# Patient Record
Sex: MALE
[De-identification: names, ages, dates, MRNs, and addresses within clinical notes are randomized per-mention and may not be internally consistent; named-entity substitution may affect disease eponyms.]

---

## 2023-12-08 ENCOUNTER — NON-APPOINTMENT (OUTPATIENT)
Age: 58
End: 2023-12-08

## 2023-12-08 ENCOUNTER — RX RENEWAL (OUTPATIENT)
Age: 58
End: 2023-12-08

## 2023-12-08 DIAGNOSIS — Z86.79 PERSONAL HISTORY OF OTHER DISEASES OF THE CIRCULATORY SYSTEM: ICD-10-CM

## 2023-12-08 DIAGNOSIS — Z78.9 OTHER SPECIFIED HEALTH STATUS: ICD-10-CM

## 2023-12-08 DIAGNOSIS — Z82.49 FAMILY HISTORY OF ISCHEMIC HEART DISEASE AND OTHER DISEASES OF THE CIRCULATORY SYSTEM: ICD-10-CM

## 2023-12-09 ENCOUNTER — RX RENEWAL (OUTPATIENT)
Age: 58
End: 2023-12-09

## 2023-12-11 ENCOUNTER — RX RENEWAL (OUTPATIENT)
Age: 58
End: 2023-12-11

## 2023-12-14 ENCOUNTER — APPOINTMENT (OUTPATIENT)
Dept: INTERNAL MEDICINE | Facility: CLINIC | Age: 58
End: 2023-12-14
Payer: COMMERCIAL

## 2023-12-14 VITALS
HEIGHT: 65 IN | BODY MASS INDEX: 27.32 KG/M2 | DIASTOLIC BLOOD PRESSURE: 93 MMHG | WEIGHT: 164 LBS | SYSTOLIC BLOOD PRESSURE: 147 MMHG

## 2023-12-14 VITALS — DIASTOLIC BLOOD PRESSURE: 86 MMHG | SYSTOLIC BLOOD PRESSURE: 140 MMHG

## 2023-12-14 PROCEDURE — 99213 OFFICE O/P EST LOW 20 MIN: CPT

## 2023-12-14 NOTE — HISTORY OF PRESENT ILLNESS
[FreeTextEntry1] : Follow-up visit [de-identified] : 58 yrs old male here for followup visit. On prior exam, patient had to discontinue Zetia due to elevated CK level of 770.  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   Last LDL was 92 on medication and Hba1c was 5.7 in 7/2023 Patient states does regular exercise and is following a diabetic diet, low salt and low fat. BP today was elevated  Allergies: NKDA- but intolerance to Lipitor/Zetia with myositis Medication : Off Zetia, no other changes Last eye exam 2023 - due back 1/2024 Last colonoscopy due 2024 (prior 2019- no polyps)  - last PSA 0.8 (5/2023) Last Urology 2-3 yrs ago- due now Cardiology: Last check 5 yrs ago

## 2023-12-14 NOTE — PHYSICAL EXAM
[No Acute Distress] : no acute distress [Well Nourished] : well nourished [Well Developed] : well developed [Well-Appearing] : well-appearing [Normal Sclera/Conjunctiva] : normal sclera/conjunctiva [PERRL] : pupils equal round and reactive to light [EOMI] : extraocular movements intact [Normal Outer Ear/Nose] : the outer ears and nose were normal in appearance [Normal Oropharynx] : the oropharynx was normal [No JVD] : no jugular venous distention [No Lymphadenopathy] : no lymphadenopathy [Supple] : supple [Thyroid Normal, No Nodules] : the thyroid was normal and there were no nodules present [No Respiratory Distress] : no respiratory distress  [No Accessory Muscle Use] : no accessory muscle use [Clear to Auscultation] : lungs were clear to auscultation bilaterally [Normal Rate] : normal rate  [Regular Rhythm] : with a regular rhythm [Normal S1, S2] : normal S1 and S2 [No Murmur] : no murmur heard [Pedal Pulses Present] : the pedal pulses are present [No Edema] : there was no peripheral edema [Soft] : abdomen soft [Non Tender] : non-tender [Non-distended] : non-distended [Normal Bowel Sounds] : normal bowel sounds [Normal Posterior Cervical Nodes] : no posterior cervical lymphadenopathy [Normal Anterior Cervical Nodes] : no anterior cervical lymphadenopathy [No Joint Swelling] : no joint swelling [Grossly Normal Strength/Tone] : grossly normal strength/tone [No Rash] : no rash [Coordination Grossly Intact] : coordination grossly intact [No Focal Deficits] : no focal deficits [Normal Gait] : normal gait [Normal Affect] : the affect was normal [Normal Insight/Judgement] : insight and judgment were intact

## 2023-12-14 NOTE — PLAN
[FreeTextEntry1] : 58 yrs old here for follow up visit.  He was noted to have elevated BP today.   Hx DMII- well controlled on diet and medication Hx hyperlipidemia - medication caused myositis and both Atorvastatin and Zetia were discontinued. Full labwork ordered now to check HbA1c, lipid and routine labs, PSA Urology consult for BPH and current penile complaints. Cardiology appt- due Preventive testing discussed with pt- due 2024 for colonoscopy Ophthalmology exam due next month All preventive screening discussed with pt Med reconciliation done and meds renewed Diet/exercise counseling done RV 6 mos- annual wellness exam Pt understands instructions and will monitor BPs at home- goal under 130/80

## 2023-12-14 NOTE — REVIEW OF SYSTEMS
[Negative] : Heme/Lymph [Dysuria] : no dysuria [Incontinence] : no incontinence [Hesitancy] : no hesitancy [Nocturia] : no nocturia [Hematuria] : no hematuria [Frequency] : no frequency [Impotence] : no impotency [Poor Libido] : libido not poor [FreeTextEntry8] : Requesting new urology referral due to penile problem

## 2023-12-16 ENCOUNTER — NON-APPOINTMENT (OUTPATIENT)
Age: 58
End: 2023-12-16

## 2023-12-16 ENCOUNTER — TRANSCRIPTION ENCOUNTER (OUTPATIENT)
Age: 58
End: 2023-12-16

## 2023-12-16 LAB
ALBUMIN SERPL ELPH-MCNC: 4.8 G/DL
ALP BLD-CCNC: 48 U/L
ALT SERPL-CCNC: 21 U/L
ANION GAP SERPL CALC-SCNC: 10 MMOL/L
AST SERPL-CCNC: 23 U/L
BASOPHILS # BLD AUTO: 0 K/UL
BASOPHILS NFR BLD AUTO: 0 %
BILIRUB SERPL-MCNC: 0.6 MG/DL
BUN SERPL-MCNC: 20 MG/DL
CALCIUM SERPL-MCNC: 9.8 MG/DL
CHLORIDE SERPL-SCNC: 103 MMOL/L
CHOLEST SERPL-MCNC: 199 MG/DL
CK SERPL-CCNC: 404 U/L
CO2 SERPL-SCNC: 27 MMOL/L
CREAT SERPL-MCNC: 0.77 MG/DL
CRP SERPL-MCNC: <3 MG/L
EGFR: 105 ML/MIN/1.73M2
EOSINOPHIL # BLD AUTO: 0.02 K/UL
EOSINOPHIL NFR BLD AUTO: 0.4 %
ERYTHROCYTE [SEDIMENTATION RATE] IN BLOOD BY WESTERGREN METHOD: 3 MM/HR
ESTIMATED AVERAGE GLUCOSE: 123 MG/DL
GLUCOSE SERPL-MCNC: 123 MG/DL
HBA1C MFR BLD HPLC: 5.9 %
HCT VFR BLD CALC: 42.5 %
HDLC SERPL-MCNC: 71 MG/DL
HGB BLD-MCNC: 13.8 G/DL
IMM GRANULOCYTES NFR BLD AUTO: 0.2 %
IRON SATN MFR SERPL: 28 %
IRON SERPL-MCNC: 88 UG/DL
LDLC SERPL CALC-MCNC: 120 MG/DL
LYMPHOCYTES # BLD AUTO: 2 K/UL
LYMPHOCYTES NFR BLD AUTO: 41.2 %
MAGNESIUM SERPL-MCNC: 1.9 MG/DL
MAN DIFF?: NORMAL
MCHC RBC-ENTMCNC: 29.9 PG
MCHC RBC-ENTMCNC: 32.5 GM/DL
MCV RBC AUTO: 92.2 FL
MONOCYTES # BLD AUTO: 0.46 K/UL
MONOCYTES NFR BLD AUTO: 9.5 %
NEUTROPHILS # BLD AUTO: 2.36 K/UL
NEUTROPHILS NFR BLD AUTO: 48.7 %
NONHDLC SERPL-MCNC: 128 MG/DL
PLATELET # BLD AUTO: 151 K/UL
POTASSIUM SERPL-SCNC: 4.7 MMOL/L
PROT SERPL-MCNC: 6.9 G/DL
PSA SERPL-MCNC: 0.49 NG/ML
RBC # BLD: 4.61 M/UL
RBC # FLD: 11.8 %
SODIUM SERPL-SCNC: 140 MMOL/L
T4 FREE SERPL-MCNC: 1.2 NG/DL
TIBC SERPL-MCNC: 318 UG/DL
TRIGL SERPL-MCNC: 41 MG/DL
TSH SERPL-ACNC: 1.12 UIU/ML
UIBC SERPL-MCNC: 229 UG/DL
WBC # FLD AUTO: 4.85 K/UL

## 2024-01-29 ENCOUNTER — NON-APPOINTMENT (OUTPATIENT)
Age: 59
End: 2024-01-29

## 2024-01-29 ENCOUNTER — APPOINTMENT (OUTPATIENT)
Dept: CARDIOLOGY | Facility: CLINIC | Age: 59
End: 2024-01-29
Payer: COMMERCIAL

## 2024-01-29 VITALS
SYSTOLIC BLOOD PRESSURE: 130 MMHG | BODY MASS INDEX: 27.32 KG/M2 | WEIGHT: 164 LBS | OXYGEN SATURATION: 98 % | HEIGHT: 65 IN | DIASTOLIC BLOOD PRESSURE: 80 MMHG | HEART RATE: 75 BPM

## 2024-01-29 DIAGNOSIS — R01.1 CARDIAC MURMUR, UNSPECIFIED: ICD-10-CM

## 2024-01-29 PROCEDURE — 99203 OFFICE O/P NEW LOW 30 MIN: CPT

## 2024-01-29 PROCEDURE — 93000 ELECTROCARDIOGRAM COMPLETE: CPT

## 2024-01-29 RX ORDER — ATORVASTATIN CALCIUM 10 MG/1
10 TABLET, FILM COATED ORAL DAILY
Refills: 0 | Status: DISCONTINUED | COMMUNITY
End: 2024-01-29

## 2024-01-29 NOTE — PHYSICAL EXAM

## 2024-01-29 NOTE — ASSESSMENT
[FreeTextEntry1] : An echocardiogram was ordered for further evaluation of his murmur He would potentially benefit from non statin/Zeita cholesterol modification; prior to starting therapy a stress test will be obtained; if negative a calcium score will be considered

## 2024-01-29 NOTE — REASON FOR VISIT
[FreeTextEntry1] : The patient has a history of DM and HTN He has failed statins and Zetia due to myositis His last LDL was 120 Currently he feels well On exam he has a 3/6 MR and TR murmur. His PMI is somewhat diffuse but is likely normal. His last echocardiogram was 2022 (mild MR/TR) 10year ASCVD risk=11.9%

## 2024-02-12 ENCOUNTER — APPOINTMENT (OUTPATIENT)
Dept: CARDIOLOGY | Facility: CLINIC | Age: 59
End: 2024-02-12
Payer: COMMERCIAL

## 2024-02-12 PROCEDURE — 93306 TTE W/DOPPLER COMPLETE: CPT

## 2024-03-11 ENCOUNTER — APPOINTMENT (OUTPATIENT)
Dept: CARDIOLOGY | Facility: CLINIC | Age: 59
End: 2024-03-11
Payer: COMMERCIAL

## 2024-03-11 PROCEDURE — 93351 STRESS TTE COMPLETE: CPT

## 2024-04-15 ENCOUNTER — APPOINTMENT (OUTPATIENT)
Dept: UROLOGY | Facility: CLINIC | Age: 59
End: 2024-04-15
Payer: COMMERCIAL

## 2024-04-15 VITALS
WEIGHT: 165 LBS | BODY MASS INDEX: 27.49 KG/M2 | DIASTOLIC BLOOD PRESSURE: 84 MMHG | HEART RATE: 75 BPM | HEIGHT: 65 IN | SYSTOLIC BLOOD PRESSURE: 137 MMHG

## 2024-04-15 PROCEDURE — 99204 OFFICE O/P NEW MOD 45 MIN: CPT

## 2024-04-15 RX ORDER — EZETIMIBE 10 MG/1
10 TABLET ORAL DAILY
Refills: 0 | Status: DISCONTINUED | COMMUNITY
End: 2024-04-15

## 2024-04-15 NOTE — PHYSICAL EXAM
[Normal Appearance] : normal appearance [Well Groomed] : well groomed [General Appearance - In No Acute Distress] : no acute distress [Edema] : no peripheral edema [Respiration, Rhythm And Depth] : normal respiratory rhythm and effort [Exaggerated Use Of Accessory Muscles For Inspiration] : no accessory muscle use [Abdomen Soft] : soft [Abdomen Tenderness] : non-tender [Costovertebral Angle Tenderness] : no ~M costovertebral angle tenderness [Urethral Meatus] : meatus normal [Penis Abnormality] : normal uncircumcised penis [Urinary Bladder Findings] : the bladder was normal on palpation [Scrotum] : the scrotum was normal [Epididymis] : the epididymides were normal [Testes Tenderness] : no tenderness of the testes [Testes Mass (___cm)] : there were no testicular masses [Normal Station and Gait] : the gait and station were normal for the patient's age [] : no rash [No Focal Deficits] : no focal deficits [Oriented To Time, Place, And Person] : oriented to person, place, and time [Affect] : the affect was normal [Mood] : the mood was normal [No Palpable Adenopathy] : no palpable adenopathy

## 2024-04-15 NOTE — HISTORY OF PRESENT ILLNESS
[FreeTextEntry1] : 57yo gentleman with cc of low T. Pt has been seen by Dr Gregorio. Was seen for BPH. He was initially referred to him for annual exam. Pt reports that his PSA is good at <1. At baseline, no urinary complaints. Checked blood work and was told T was low. Believes blood work was done in the afternoon. He reports that he is having intermittent issues with achieving an erection. he states he had a weeks to months where he was having more difficulty. Not as much now. However, he does note that his desire is lower over the last year. Sexually active with his wife. No use of PDE5Is. He reports decreased ability to acheive erections with lower strength. No loss of erection prior to ejaculation. No use of meds.   has hx of DM on metofrmin. Last a1c was 5.9 in Dec 2023. Has HTN on telmisartan. No smoking. Has AMAN but does not always wear mask.   PSA is 0.49 in 12/2023.

## 2024-06-15 ENCOUNTER — NON-APPOINTMENT (OUTPATIENT)
Age: 59
End: 2024-06-15

## 2024-06-15 ENCOUNTER — APPOINTMENT (OUTPATIENT)
Dept: INTERNAL MEDICINE | Facility: CLINIC | Age: 59
End: 2024-06-15
Payer: COMMERCIAL

## 2024-06-15 VITALS
OXYGEN SATURATION: 97 % | HEIGHT: 65 IN | SYSTOLIC BLOOD PRESSURE: 120 MMHG | DIASTOLIC BLOOD PRESSURE: 80 MMHG | HEART RATE: 62 BPM | WEIGHT: 165 LBS | BODY MASS INDEX: 27.49 KG/M2

## 2024-06-15 DIAGNOSIS — I34.0 NONRHEUMATIC MITRAL (VALVE) INSUFFICIENCY: ICD-10-CM

## 2024-06-15 DIAGNOSIS — K21.9 GASTRO-ESOPHAGEAL REFLUX DISEASE W/OUT ESOPHAGITIS: ICD-10-CM

## 2024-06-15 DIAGNOSIS — I10 ESSENTIAL (PRIMARY) HYPERTENSION: ICD-10-CM

## 2024-06-15 DIAGNOSIS — G47.33 OBSTRUCTIVE SLEEP APNEA (ADULT) (PEDIATRIC): ICD-10-CM

## 2024-06-15 DIAGNOSIS — I07.1 RHEUMATIC TRICUSPID INSUFFICIENCY: ICD-10-CM

## 2024-06-15 DIAGNOSIS — N40.0 BENIGN PROSTATIC HYPERPLASIA WITHOUT LOWER URINARY TRACT SYMPMS: ICD-10-CM

## 2024-06-15 DIAGNOSIS — E11.9 TYPE 2 DIABETES MELLITUS W/OUT COMPLICATIONS: ICD-10-CM

## 2024-06-15 DIAGNOSIS — Z00.00 ENCOUNTER FOR GENERAL ADULT MEDICAL EXAMINATION W/OUT ABNORMAL FINDINGS: ICD-10-CM

## 2024-06-15 DIAGNOSIS — R73.03 PREDIABETES.: ICD-10-CM

## 2024-06-15 DIAGNOSIS — E78.5 HYPERLIPIDEMIA, UNSPECIFIED: ICD-10-CM

## 2024-06-15 PROCEDURE — 99396 PREV VISIT EST AGE 40-64: CPT

## 2024-06-15 PROCEDURE — 93000 ELECTROCARDIOGRAM COMPLETE: CPT

## 2024-06-15 RX ORDER — TELMISARTAN 40 MG/1
40 TABLET ORAL DAILY
Qty: 90 | Refills: 1 | Status: ACTIVE | COMMUNITY
Start: 1900-01-01 | End: 1900-01-01

## 2024-06-15 NOTE — PLAN
[FreeTextEntry1] : 58 years old male here for annual wellness examination.  He has not tolerated statins or Zetia for his hyperlipidemia due to myositis.  He saw cardiology recently for full workup including negative stress echocardiogram, with history of mild MR/TR.  Cardiology recommended a coronary calcium CT scan-patient agrees to do, ordered.  Patient also having numbness in left hand, due to carpal tunnel syndrome, and will likely have surgery. Also seeing new urologist for low testosterone and BPH. Diet is healthy and diabetic diet..  Patient does cardio, walking a lot at work. Patient is up-to-date on his preventive screening, plans to repeat colonoscopy in 2025, prior in 2019 was clear. EKG normal today No clinical depression Full lab work ordered including CBC, CMP, lipid, TFTs, LFTs, A1c, urine screen, vitamin levels.  PSA done with urologist.  Pain/numbness left hand due to carpal tunnel syndrome- states will need surgery for left hand as well. Recent urology exam for Low testosterone, BPH -had PSA testing Patient up-to-date with vaccines. RV 6 months Patient understands instructions and agrees with plan Will contact patient with results of testing

## 2024-06-15 NOTE — HISTORY OF PRESENT ILLNESS
[FreeTextEntry1] : Annual Wellness examination [de-identified] : 58 yrs old male with history of hyperlipidemia- with myositis secondary to statin/Zetia, GERD, DMII, HTN, low T, mild MR/TR, AMAN- CPAP uses most of time, prediabetes, here for annual wellness examination.  New history: On prior exams, patient had to discontinue Zetia due to elevated CK level of 770. 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 Pain/numbness left hand due to carpal tunnel syndrome- states will need surgery for left hand as well. Recent urology exam for Low testosterone, BPH  PSH: 2008 :Right Carpal tunnel surgery Bilateral ear surgery  left, 2018 right - conduction loss now no hearing aides- hears well. Surgeon Dr. 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  SH: Nonsmoker, No drugs, no ETOH FH: Mother HTN 82  alive- Hyperlipidemia/DMII .   No cancer in family    Preventive screening: Urology:  -recent exam- low testosterone- seeing new urologist  CRC screenin colonoscopy clear -states will do another next year  Ophthalmology: Annual due  - normal exams  Labwork: Today  Cardiac: Had full cardiac workup 3/2024 Echo stress test neg, has mild MR/TR.  Cardiology recommended coronary calcium score- pt will do. EKG today normal  Need for lung cancer screening/smoker 20 pack yrs/50+/smoker within 15 yrs: Neg  Dental: Annual -good  Dermatology: no recent  Fall risk: No falls  Advanced directives: No health care proxy form.  Bone density: Not at risk   PHQ-2 neg SBIRT ; neg

## 2024-06-15 NOTE — PHYSICAL EXAM
[No Acute Distress] : no acute distress [Well Nourished] : well nourished [Well Developed] : well developed [Well-Appearing] : well-appearing [Normal Sclera/Conjunctiva] : normal sclera/conjunctiva [PERRL] : pupils equal round and reactive to light [EOMI] : extraocular movements intact [Normal Outer Ear/Nose] : the outer ears and nose were normal in appearance [Normal Oropharynx] : the oropharynx was normal [No JVD] : no jugular venous distention [No Lymphadenopathy] : no lymphadenopathy [Supple] : supple [Thyroid Normal, No Nodules] : the thyroid was normal and there were no nodules present [No Respiratory Distress] : no respiratory distress  [No Accessory Muscle Use] : no accessory muscle use [Clear to Auscultation] : lungs were clear to auscultation bilaterally [Normal Rate] : normal rate  [Regular Rhythm] : with a regular rhythm [Normal S1, S2] : normal S1 and S2 [Pedal Pulses Present] : the pedal pulses are present [No Edema] : there was no peripheral edema [Soft] : abdomen soft [Non Tender] : non-tender [Non-distended] : non-distended [Normal Posterior Cervical Nodes] : no posterior cervical lymphadenopathy [Normal Anterior Cervical Nodes] : no anterior cervical lymphadenopathy [No CVA Tenderness] : no CVA  tenderness [No Spinal Tenderness] : no spinal tenderness [No Joint Swelling] : no joint swelling [Grossly Normal Strength/Tone] : grossly normal strength/tone [No Rash] : no rash [Coordination Grossly Intact] : coordination grossly intact [No Focal Deficits] : no focal deficits [Normal Gait] : normal gait [Normal Affect] : the affect was normal [Normal Insight/Judgement] : insight and judgment were intact [de-identified] : Normotensive  BMI: 27.46 [de-identified] : +JOSE

## 2024-06-17 LAB
25(OH)D3 SERPL-MCNC: 20.7 NG/ML
ALBUMIN SERPL ELPH-MCNC: 5.1 G/DL
ALP BLD-CCNC: 54 U/L
ALT SERPL-CCNC: 30 U/L
ANION GAP SERPL CALC-SCNC: 11 MMOL/L
APPEARANCE: CLEAR
AST SERPL-CCNC: 21 U/L
BASOPHILS # BLD AUTO: 0.02 K/UL
BASOPHILS NFR BLD AUTO: 0.4 %
BILIRUB SERPL-MCNC: 0.7 MG/DL
BILIRUBIN URINE: NEGATIVE
BLOOD URINE: NEGATIVE
BUN SERPL-MCNC: 24 MG/DL
CALCIUM SERPL-MCNC: 10.2 MG/DL
CHLORIDE SERPL-SCNC: 104 MMOL/L
CHOLEST SERPL-MCNC: 283 MG/DL
CK SERPL-CCNC: 338 U/L
CO2 SERPL-SCNC: 26 MMOL/L
COLOR: YELLOW
CREAT SERPL-MCNC: 0.94 MG/DL
CREAT SPEC-SCNC: 237 MG/DL
CRP SERPL-MCNC: <3 MG/L
EGFR: 95 ML/MIN/1.73M2
EOSINOPHIL # BLD AUTO: 0.01 K/UL
EOSINOPHIL NFR BLD AUTO: 0.2 %
ERYTHROCYTE [SEDIMENTATION RATE] IN BLOOD BY WESTERGREN METHOD: 3 MM/HR
ESTIMATED AVERAGE GLUCOSE: 148 MG/DL
FERRITIN SERPL-MCNC: 776 NG/ML
FOLATE SERPL-MCNC: 13.8 NG/ML
GLUCOSE QUALITATIVE U: NEGATIVE MG/DL
GLUCOSE SERPL-MCNC: 155 MG/DL
HBA1C MFR BLD HPLC: 6.8 %
HCT VFR BLD CALC: 47.2 %
HDLC SERPL-MCNC: 68 MG/DL
HGB BLD-MCNC: 15.2 G/DL
IMM GRANULOCYTES NFR BLD AUTO: 0.2 %
IRON SATN MFR SERPL: 28 %
IRON SERPL-MCNC: 93 UG/DL
KETONES URINE: NEGATIVE MG/DL
LDLC SERPL CALC-MCNC: 204 MG/DL
LEUKOCYTE ESTERASE URINE: NEGATIVE
LYMPHOCYTES # BLD AUTO: 2.15 K/UL
LYMPHOCYTES NFR BLD AUTO: 40.1 %
MAGNESIUM SERPL-MCNC: 2 MG/DL
MAN DIFF?: NORMAL
MCHC RBC-ENTMCNC: 30.3 PG
MCHC RBC-ENTMCNC: 32.2 GM/DL
MCV RBC AUTO: 94 FL
MICROALBUMIN 24H UR DL<=1MG/L-MCNC: <1.2 MG/DL
MICROALBUMIN/CREAT 24H UR-RTO: NORMAL MG/G
MONOCYTES # BLD AUTO: 0.44 K/UL
MONOCYTES NFR BLD AUTO: 8.2 %
NEUTROPHILS # BLD AUTO: 2.73 K/UL
NEUTROPHILS NFR BLD AUTO: 50.9 %
NITRITE URINE: NEGATIVE
NONHDLC SERPL-MCNC: 215 MG/DL
PH URINE: 5.5
PLATELET # BLD AUTO: 209 K/UL
POTASSIUM SERPL-SCNC: 4.6 MMOL/L
PROT SERPL-MCNC: 7.8 G/DL
PROTEIN URINE: NEGATIVE MG/DL
PSA SERPL-MCNC: 0.64 NG/ML
RBC # BLD: 5.02 M/UL
RBC # FLD: 11.9 %
SODIUM SERPL-SCNC: 141 MMOL/L
SPECIFIC GRAVITY URINE: >1.03
T4 FREE SERPL-MCNC: 1.2 NG/DL
TESTOST SERPL-MCNC: 259 NG/DL
TIBC SERPL-MCNC: 336 UG/DL
TRIGL SERPL-MCNC: 69 MG/DL
TSH SERPL-ACNC: 1.17 UIU/ML
UIBC SERPL-MCNC: 243 UG/DL
UROBILINOGEN URINE: 1 MG/DL
VIT B12 SERPL-MCNC: 724 PG/ML
WBC # FLD AUTO: 5.36 K/UL

## 2024-06-18 ENCOUNTER — TRANSCRIPTION ENCOUNTER (OUTPATIENT)
Age: 59
End: 2024-06-18

## 2024-06-18 DIAGNOSIS — R79.89 OTHER SPECIFIED ABNORMAL FINDINGS OF BLOOD CHEMISTRY: ICD-10-CM

## 2024-06-18 RX ORDER — METFORMIN HYDROCHLORIDE 500 MG/1
500 TABLET, COATED ORAL DAILY
Qty: 180 | Refills: 1 | Status: ACTIVE | COMMUNITY
Start: 2023-12-09 | End: 1900-01-01

## 2024-09-07 ENCOUNTER — APPOINTMENT (OUTPATIENT)
Dept: INTERNAL MEDICINE | Facility: CLINIC | Age: 59
End: 2024-09-07
Payer: COMMERCIAL

## 2024-09-07 VITALS
DIASTOLIC BLOOD PRESSURE: 94 MMHG | OXYGEN SATURATION: 98 % | WEIGHT: 167.56 LBS | HEART RATE: 57 BPM | BODY MASS INDEX: 27.92 KG/M2 | HEIGHT: 65 IN | SYSTOLIC BLOOD PRESSURE: 151 MMHG

## 2024-09-07 VITALS — DIASTOLIC BLOOD PRESSURE: 78 MMHG | SYSTOLIC BLOOD PRESSURE: 130 MMHG

## 2024-09-07 DIAGNOSIS — I34.0 NONRHEUMATIC MITRAL (VALVE) INSUFFICIENCY: ICD-10-CM

## 2024-09-07 DIAGNOSIS — N52.9 MALE ERECTILE DYSFUNCTION, UNSPECIFIED: ICD-10-CM

## 2024-09-07 DIAGNOSIS — E11.9 TYPE 2 DIABETES MELLITUS W/OUT COMPLICATIONS: ICD-10-CM

## 2024-09-07 DIAGNOSIS — I10 ESSENTIAL (PRIMARY) HYPERTENSION: ICD-10-CM

## 2024-09-07 DIAGNOSIS — E78.5 HYPERLIPIDEMIA, UNSPECIFIED: ICD-10-CM

## 2024-09-07 DIAGNOSIS — N40.0 BENIGN PROSTATIC HYPERPLASIA WITHOUT LOWER URINARY TRACT SYMPMS: ICD-10-CM

## 2024-09-07 DIAGNOSIS — R79.89 OTHER SPECIFIED ABNORMAL FINDINGS OF BLOOD CHEMISTRY: ICD-10-CM

## 2024-09-07 DIAGNOSIS — K21.9 GASTRO-ESOPHAGEAL REFLUX DISEASE W/OUT ESOPHAGITIS: ICD-10-CM

## 2024-09-07 PROCEDURE — G0444 DEPRESSION SCREEN ANNUAL: CPT | Mod: 59

## 2024-09-07 PROCEDURE — 99213 OFFICE O/P EST LOW 20 MIN: CPT

## 2024-09-07 NOTE — PHYSICAL EXAM
[No Edema] : there was no peripheral edema [Normal Rate] : normal rate  [Regular Rhythm] : with a regular rhythm [Normal S1, S2] : normal S1 and S2 [Coordination Grossly Intact] : coordination grossly intact [No Focal Deficits] : no focal deficits [Normal Gait] : normal gait [Normal] : affect was normal and insight and judgment were intact [de-identified] : Normotensive  BMI: 27.88 [de-identified] : +JOSE

## 2024-09-07 NOTE — REVIEW OF SYSTEMS
[Negative] : Heme/Lymph [FreeTextEntry2] : Patient gained 2lbs from prior visit [FreeTextEntry1] : +ED sees urology

## 2024-09-07 NOTE — PHYSICAL EXAM
[No Edema] : there was no peripheral edema [Normal Rate] : normal rate  [Regular Rhythm] : with a regular rhythm [Normal S1, S2] : normal S1 and S2 [Coordination Grossly Intact] : coordination grossly intact [No Focal Deficits] : no focal deficits [Normal Gait] : normal gait [Normal] : affect was normal and insight and judgment were intact [de-identified] : Normotensive  BMI: 27.88 [de-identified] : +JOSE

## 2024-09-07 NOTE — HEALTH RISK ASSESSMENT
[Little interest or pleasure doing things] : 1) Little interest or pleasure doing things [Feeling down, depressed, or hopeless] : 2) Feeling down, depressed, or hopeless [0] : 2) Feeling down, depressed, or hopeless: Not at all (0) [PHQ-2 Negative - No further assessment needed] : PHQ-2 Negative - No further assessment needed [Time Spent: ___ Minutes] : I spent [unfilled] minutes performing a depression screening for this patient. [[id number]] : 0

## 2024-09-07 NOTE — HISTORY OF PRESENT ILLNESS
[FreeTextEntry1] : Follow-up visit [de-identified] : 59 yrs old male with history of hyperlipidemia- with myositis secondary to statin/Zetia, GERD, DMII, HTN, low T, mild MR/TR, AMAN- CPAP uses most of time, prediabetes, here for followup visit.  Patient's initial /94, repeat 130/78. Patient checks occasionally at home and generally 130s/80s-low90s. States watches salt most of the time.  Also,  New history: Sees  for BPH, Low T/ED- and concerned possibly Metformin may be worsening situation. Has follow-up visit this week. Last cholesterol 6/15/24 283, HDL 68    TG 69 and hbA1c 6.8/glucose 155. (Off statin and Zetia) Calcium score coronary 9 (6/2024)  On prior exams, patient had to discontinue Zetia due to elevated CK level of 770. 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 PSH: 2008 :Right Carpal tunnel surgery Bilateral ear surgery 2014 left, 11/2018 right - conduction loss now no hearing aides- hears well. Surgeon Dr. 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  SH: Nonsmoker, No drugs, no ETOH FH: **NEW: Sister with pancreatic cancer 70s.  Mother HTN 82 alive- Hyperlipidemia/DMII. No cancer in family  Preventive screening: CRC : 2019 colonoscopy- due 2024 Labwork today Cardiology recent workup 2024 Ophthalmology annual- 2024 PHQ-2 neg

## 2024-09-07 NOTE — PLAN
[FreeTextEntry1] : 59 years old male here for follow-up visit. Regarding DMII, last A1c 6.8 and metformin increased to 1000 mg nightly. Regarding hypertension, BPs running borderline elevated or normal. Regarding hyperlipidemia, patient is off statin/Zetia due to myositis and last LDL was 204. Patient is also seeing urology regularly for ET/Low T and BPH.  Patient feels that raising metformin may have worsened ED.  He will speak with his urologist this week.   Full lab work ordered including CMP, CK, lipid, lipoprotein a, A1c, amylase/lipase Recent abdominal ultrasound showed normal liver and no pancreatic pathology, although some was obscured by bowel gas. Preventive screening discussed with patient, and he is due to see cardiologist. He is up-to-date on most other testing.  Depression screen was negative Medication reconciliation done. Diet and exercise discussed with patient. RV 3 months-as lipid levels are still very high.  Patient will try stricter diet. Patient understands instructions and agrees with plan. Will contact patient with results of all testing.

## 2024-09-07 NOTE — HEALTH RISK ASSESSMENT
[Little interest or pleasure doing things] : 1) Little interest or pleasure doing things [Feeling down, depressed, or hopeless] : 2) Feeling down, depressed, or hopeless [0] : 2) Feeling down, depressed, or hopeless: Not at all (0) [PHQ-2 Negative - No further assessment needed] : PHQ-2 Negative - No further assessment needed [Time Spent: ___ Minutes] : I spent [unfilled] minutes performing a depression screening for this patient. [[id number]] : 0

## 2024-09-07 NOTE — HISTORY OF PRESENT ILLNESS
[FreeTextEntry1] : Follow-up visit [de-identified] : 59 yrs old male with history of hyperlipidemia- with myositis secondary to statin/Zetia, GERD, DMII, HTN, low T, mild MR/TR, AMAN- CPAP uses most of time, prediabetes, here for followup visit.  Patient's initial /94, repeat 130/78. Patient checks occasionally at home and generally 130s/80s-low90s. States watches salt most of the time.  Also,  New history: Sees  for BPH, Low T/ED- and concerned possibly Metformin may be worsening situation. Has follow-up visit this week. Last cholesterol 6/15/24 283, HDL 68    TG 69 and hbA1c 6.8/glucose 155. (Off statin and Zetia) Calcium score coronary 9 (6/2024)  On prior exams, patient had to discontinue Zetia due to elevated CK level of 770. 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 PSH: 2008 :Right Carpal tunnel surgery Bilateral ear surgery 2014 left, 11/2018 right - conduction loss now no hearing aides- hears well. Surgeon Dr. 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  SH: Nonsmoker, No drugs, no ETOH FH: **NEW: Sister with pancreatic cancer 70s.  Mother HTN 82 alive- Hyperlipidemia/DMII. No cancer in family  Preventive screening: CRC : 2019 colonoscopy- due 2024 Labwork today Cardiology recent workup 2024 Ophthalmology annual- 2024 PHQ-2 neg

## 2024-09-09 LAB
25(OH)D3 SERPL-MCNC: 14.9 NG/ML
ALBUMIN SERPL ELPH-MCNC: 4.9 G/DL
ALBUMIN SERPL ELPH-MCNC: 5 G/DL
ALP BLD-CCNC: 56 U/L
ALP BLD-CCNC: 59 U/L
ALT SERPL-CCNC: 23 U/L
ALT SERPL-CCNC: 23 U/L
AMYLASE/CREAT SERPL: 151 U/L
ANION GAP SERPL CALC-SCNC: 13 MMOL/L
ANION GAP SERPL CALC-SCNC: 20 MMOL/L
APO A-I SERPL-MCNC: 180 MG/DL
APO A-I/APO B SERPL: 0.59 RATIO
APO B SERPL-MCNC: 106 MG/DL
APO LP(A) SERPL-MCNC: 154.2 NMOL/L
APPEARANCE: CLEAR
AST SERPL-CCNC: 20 U/L
AST SERPL-CCNC: 21 U/L
BILIRUB SERPL-MCNC: 0.6 MG/DL
BILIRUB SERPL-MCNC: 0.7 MG/DL
BILIRUBIN URINE: NEGATIVE
BLOOD URINE: NEGATIVE
BUN SERPL-MCNC: 15 MG/DL
BUN SERPL-MCNC: 15 MG/DL
CALCIUM SERPL-MCNC: 10.2 MG/DL
CALCIUM SERPL-MCNC: 10.3 MG/DL
CHLORIDE SERPL-SCNC: 101 MMOL/L
CHLORIDE SERPL-SCNC: 102 MMOL/L
CHOLEST SERPL-MCNC: 232 MG/DL
CHOLEST SERPL-MCNC: 234 MG/DL
CK SERPL-CCNC: 373 U/L
CO2 SERPL-SCNC: 18 MMOL/L
CO2 SERPL-SCNC: 22 MMOL/L
COLOR: YELLOW
CREAT SERPL-MCNC: 0.94 MG/DL
CREAT SERPL-MCNC: 0.97 MG/DL
CRP SERPL HS-MCNC: 0.33 MG/L
EGFR: 91 ML/MIN/1.73M2
EGFR: 95 ML/MIN/1.73M2
ESTIMATED AVERAGE GLUCOSE: 131 MG/DL
ESTIMATED AVERAGE GLUCOSE: 131 MG/DL
ESTRADIOL SERPL-MCNC: 18 PG/ML
GLUCOSE QUALITATIVE U: NEGATIVE MG/DL
GLUCOSE SERPL-MCNC: 147 MG/DL
GLUCOSE SERPL-MCNC: 148 MG/DL
HBA1C MFR BLD HPLC: 6.2 %
HBA1C MFR BLD HPLC: 6.2 %
HCT VFR BLD CALC: 47 %
HDLC SERPL-MCNC: 70 MG/DL
HDLC SERPL-MCNC: 70 MG/DL
HGB BLD-MCNC: 14.9 G/DL
KETONES URINE: NEGATIVE MG/DL
LDLC SERPL CALC-MCNC: 147 MG/DL
LDLC SERPL CALC-MCNC: 149 MG/DL
LEUKOCYTE ESTERASE URINE: NEGATIVE
LH SERPL-ACNC: 3.9 IU/L
LPL SERPL-CCNC: 39 U/L
MCHC RBC-ENTMCNC: 30.1 PG
MCHC RBC-ENTMCNC: 31.7 GM/DL
MCV RBC AUTO: 94.9 FL
NITRITE URINE: NEGATIVE
NONHDLC SERPL-MCNC: 162 MG/DL
NONHDLC SERPL-MCNC: 164 MG/DL
PH URINE: 5.5
PLATELET # BLD AUTO: 172 K/UL
POTASSIUM SERPL-SCNC: 4.9 MMOL/L
POTASSIUM SERPL-SCNC: 4.9 MMOL/L
PROLACTIN SERPL-MCNC: 7.6 NG/ML
PROT SERPL-MCNC: 7.3 G/DL
PROT SERPL-MCNC: 7.5 G/DL
PROTEIN URINE: NEGATIVE MG/DL
PSA SERPL-MCNC: 0.62 NG/ML
RBC # BLD: 4.95 M/UL
RBC # FLD: 12.2 %
SODIUM SERPL-SCNC: 138 MMOL/L
SODIUM SERPL-SCNC: 140 MMOL/L
SPECIFIC GRAVITY URINE: 1.02
TRIGL SERPL-MCNC: 85 MG/DL
TRIGL SERPL-MCNC: 86 MG/DL
TSH SERPL-ACNC: 1.24 UIU/ML
UROBILINOGEN URINE: 0.2 MG/DL
WBC # FLD AUTO: 4.34 K/UL

## 2024-09-10 ENCOUNTER — APPOINTMENT (OUTPATIENT)
Dept: UROLOGY | Facility: CLINIC | Age: 59
End: 2024-09-10
Payer: COMMERCIAL

## 2024-09-10 VITALS
SYSTOLIC BLOOD PRESSURE: 166 MMHG | HEIGHT: 66 IN | RESPIRATION RATE: 17 BRPM | BODY MASS INDEX: 26.84 KG/M2 | DIASTOLIC BLOOD PRESSURE: 88 MMHG | WEIGHT: 167 LBS | HEART RATE: 69 BPM

## 2024-09-10 PROCEDURE — G2211 COMPLEX E/M VISIT ADD ON: CPT | Mod: NC

## 2024-09-10 PROCEDURE — 99214 OFFICE O/P EST MOD 30 MIN: CPT

## 2024-09-10 RX ORDER — SILDENAFIL 25 MG/1
25 TABLET ORAL
Qty: 30 | Refills: 1 | Status: ACTIVE | COMMUNITY
Start: 2024-09-10 | End: 1900-01-01

## 2024-09-10 NOTE — ASSESSMENT
[FreeTextEntry1] : This pleasant gentleman presents with concerns regarding his testosterone and erectile dysfunction.  I have requested several baseline blood studies and additional imaging. We discussed at length several treatment options including.   Urine analysis was requested.  His blood test were normal. He had received 25 mg Sildenafil from [facility]. We discussed the proper usage of the medication. I will see him back by telehealth in a month to discuss the efficacy.  Consultation: 45 minutes reviewing his history and discussing prior results, discussing various treatment options, writing medication prescriptions, reviewing his lab testing and writing his note. There was also additional time in preparing for the visit.

## 2024-09-10 NOTE — HISTORY OF PRESENT ILLNESS
[FreeTextEntry1] : Patient is a 59-year-old diabetic gentleman who presents with a chief complaint of erectile dysfunction and low testosterone. He states that this has been present for the past 2 years. It causes both he and his partner great stress.  I reviewed the questionnaire he completed in detail. His erections are not modified with the degree of sexual stimulation.   He states that his erections presently are often less than 5 out of 10 in both tumescence and rigidity, insufficient for penetration.   He often ejaculates through a flaccid phallus.  He has difficulty maintaining an erection. He describes a normal libido.  His sexual dysfunction occurs with both sexual relations and masturbation.  His erections are not improved with PDE5 inhibitors.    His partner is understanding and was unable to be with him at the visit today. He is not  and has adult children.    The patient denies fevers, chills, nausea and/or vomiting and no unexplained weight loss.  His past medical history is non-contributory.  In his present occupation he has no known toxin exposure. He does not smoke and drinks socially.  He has no known drug allergies. His review of systems and past medical history demonstrates no significant urologic issues (see patient completed questionnaire). His family history demonstrates no significant urologic issues. A chaperone was offered to be present for the examination but declined by the patient.

## 2024-09-10 NOTE — PHYSICAL EXAM
[Penis Abnormality] : normal uncircumcised penis [de-identified] : 2/3 systolic murmur. He has been evaluated for [de-identified] : penile plaque, 1 cm mid phallus dorsal

## 2024-09-10 NOTE — PHYSICAL EXAM
[Penis Abnormality] : normal uncircumcised penis [de-identified] : 2/3 systolic murmur. He has been evaluated for [de-identified] : penile plaque, 1 cm mid phallus dorsal

## 2024-09-12 ENCOUNTER — NON-APPOINTMENT (OUTPATIENT)
Age: 59
End: 2024-09-12

## 2024-09-12 LAB
TESTOSTERONE FREE/WEAKLY BND: 107.5 NG/DL
TESTOSTERONE TOTAL S: 368 NG/DL
TESTOSTERONE, % FREE/WEAKLY BND: 29.2 %

## 2024-09-14 ENCOUNTER — NON-APPOINTMENT (OUTPATIENT)
Age: 59
End: 2024-09-14

## 2024-09-14 ENCOUNTER — TRANSCRIPTION ENCOUNTER (OUTPATIENT)
Age: 59
End: 2024-09-14

## 2024-09-14 LAB
TESTOST FREE SERPL-MCNC: 8.3 PG/ML
TESTOST SERPL-MCNC: 373 NG/DL

## 2024-09-14 RX ORDER — AMLODIPINE BESYLATE 2.5 MG/1
2.5 TABLET ORAL DAILY
Qty: 90 | Refills: 1 | Status: ACTIVE | COMMUNITY
Start: 2024-09-14 | End: 1900-01-01

## 2024-09-24 ENCOUNTER — TRANSCRIPTION ENCOUNTER (OUTPATIENT)
Age: 59
End: 2024-09-24

## 2024-09-28 ENCOUNTER — RX RENEWAL (OUTPATIENT)
Age: 59
End: 2024-09-28

## 2024-12-09 ENCOUNTER — RX RENEWAL (OUTPATIENT)
Age: 59
End: 2024-12-09

## 2025-07-15 ENCOUNTER — APPOINTMENT (OUTPATIENT)
Dept: INTERNAL MEDICINE | Facility: CLINIC | Age: 60
End: 2025-07-15

## 2025-07-26 ENCOUNTER — APPOINTMENT (OUTPATIENT)
Dept: INTERNAL MEDICINE | Facility: CLINIC | Age: 60
End: 2025-07-26

## 2025-08-14 ENCOUNTER — APPOINTMENT (OUTPATIENT)
Dept: UROLOGY | Facility: CLINIC | Age: 60
End: 2025-08-14
Payer: COMMERCIAL

## 2025-08-14 VITALS
HEIGHT: 66 IN | WEIGHT: 163 LBS | DIASTOLIC BLOOD PRESSURE: 81 MMHG | HEART RATE: 56 BPM | BODY MASS INDEX: 26.2 KG/M2 | SYSTOLIC BLOOD PRESSURE: 133 MMHG

## 2025-08-14 DIAGNOSIS — N52.9 MALE ERECTILE DYSFUNCTION, UNSPECIFIED: ICD-10-CM

## 2025-08-14 PROCEDURE — 99214 OFFICE O/P EST MOD 30 MIN: CPT

## 2025-08-14 PROCEDURE — G2211 COMPLEX E/M VISIT ADD ON: CPT | Mod: NC

## 2025-08-18 ENCOUNTER — TRANSCRIPTION ENCOUNTER (OUTPATIENT)
Age: 60
End: 2025-08-18

## 2025-08-18 RX ORDER — BLOOD-GLUCOSE SENSOR
EACH MISCELLANEOUS
Qty: 6 | Refills: 3 | Status: ACTIVE | COMMUNITY
Start: 2025-08-18 | End: 1900-01-01

## 2025-08-18 RX ORDER — BLOOD-GLUCOSE,RECEIVER,CONT
EACH MISCELLANEOUS
Qty: 1 | Refills: 0 | Status: ACTIVE | COMMUNITY
Start: 2025-08-18 | End: 1900-01-01

## 2025-08-20 ENCOUNTER — APPOINTMENT (OUTPATIENT)
Dept: UROLOGY | Facility: CLINIC | Age: 60
End: 2025-08-20
Payer: COMMERCIAL

## 2025-08-20 VITALS
RESPIRATION RATE: 16 BRPM | HEART RATE: 57 BPM | OXYGEN SATURATION: 99 % | WEIGHT: 163 LBS | HEIGHT: 66 IN | DIASTOLIC BLOOD PRESSURE: 66 MMHG | TEMPERATURE: 97.1 F | BODY MASS INDEX: 26.2 KG/M2 | SYSTOLIC BLOOD PRESSURE: 108 MMHG

## 2025-08-20 DIAGNOSIS — N47.1 PHIMOSIS: ICD-10-CM

## 2025-08-20 LAB
25(OH)D3 SERPL-MCNC: 26.6 NG/ML
ALBUMIN SERPL ELPH-MCNC: 4.6 G/DL
ALP BLD-CCNC: 53 U/L
ALT SERPL-CCNC: 29 U/L
ANION GAP SERPL CALC-SCNC: 14 MMOL/L
AST SERPL-CCNC: 21 U/L
BILIRUB SERPL-MCNC: 0.6 MG/DL
BUN SERPL-MCNC: 19 MG/DL
CALCIUM SERPL-MCNC: 10.1 MG/DL
CHLORIDE SERPL-SCNC: 102 MMOL/L
CHOLEST SERPL-MCNC: 214 MG/DL
CO2 SERPL-SCNC: 22 MMOL/L
CREAT SERPL-MCNC: 0.77 MG/DL
CRP SERPL HS-MCNC: 0.63 MG/L
EGFRCR SERPLBLD CKD-EPI 2021: 104 ML/MIN/1.73M2
ESTIMATED AVERAGE GLUCOSE: 232 MG/DL
ESTRADIOL SERPL-MCNC: 12 PG/ML
GLUCOSE SERPL-MCNC: 220 MG/DL
HBA1C MFR BLD HPLC: 9.7 %
HCT VFR BLD CALC: 40.6 %
HDLC SERPL-MCNC: 65 MG/DL
HGB BLD-MCNC: 13.2 G/DL
LDLC SERPL-MCNC: 134 MG/DL
LH SERPL-ACNC: 9.1 IU/L
MCHC RBC-ENTMCNC: 30.1 PG
MCHC RBC-ENTMCNC: 32.5 G/DL
MCV RBC AUTO: 92.5 FL
NONHDLC SERPL-MCNC: 149 MG/DL
PLATELET # BLD AUTO: 162 K/UL
POTASSIUM SERPL-SCNC: 4.8 MMOL/L
PROLACTIN SERPL-MCNC: 8.9 NG/ML
PROT SERPL-MCNC: 6.8 G/DL
PSA SERPL-MCNC: 0.62 NG/ML
RBC # BLD: 4.39 M/UL
RBC # FLD: 12.5 %
SODIUM SERPL-SCNC: 138 MMOL/L
TESTOST FREE SERPL-MCNC: 12.8 PG/ML
TESTOST SERPL-MCNC: 443 NG/DL
TRIGL SERPL-MCNC: 79 MG/DL
TSH SERPL-ACNC: 1.53 UIU/ML
WBC # FLD AUTO: 4.65 K/UL

## 2025-08-20 PROCEDURE — 99213 OFFICE O/P EST LOW 20 MIN: CPT

## 2025-08-21 ENCOUNTER — TRANSCRIPTION ENCOUNTER (OUTPATIENT)
Age: 60
End: 2025-08-21

## 2025-08-21 PROBLEM — N47.1 PHIMOSIS: Status: ACTIVE | Noted: 2025-08-20

## 2025-08-22 LAB
TESTOSTERONE FREE/WEAKLY BND: 113.3 NG/DL
TESTOSTERONE TOTAL S: 482 NG/DL
TESTOSTERONE, % FREE/WEAKLY BND: 23.5 %

## 2025-08-23 ENCOUNTER — APPOINTMENT (OUTPATIENT)
Dept: INTERNAL MEDICINE | Facility: CLINIC | Age: 60
End: 2025-08-23

## 2025-08-23 VITALS
HEART RATE: 53 BPM | HEIGHT: 66 IN | SYSTOLIC BLOOD PRESSURE: 95 MMHG | WEIGHT: 163 LBS | OXYGEN SATURATION: 99 % | DIASTOLIC BLOOD PRESSURE: 60 MMHG | BODY MASS INDEX: 26.2 KG/M2

## 2025-08-23 DIAGNOSIS — I07.1 RHEUMATIC TRICUSPID INSUFFICIENCY: ICD-10-CM

## 2025-08-23 DIAGNOSIS — K21.9 GASTRO-ESOPHAGEAL REFLUX DISEASE W/OUT ESOPHAGITIS: ICD-10-CM

## 2025-08-23 DIAGNOSIS — N40.0 BENIGN PROSTATIC HYPERPLASIA WITHOUT LOWER URINARY TRACT SYMPMS: ICD-10-CM

## 2025-08-23 DIAGNOSIS — E78.5 HYPERLIPIDEMIA, UNSPECIFIED: ICD-10-CM

## 2025-08-23 DIAGNOSIS — I10 ESSENTIAL (PRIMARY) HYPERTENSION: ICD-10-CM

## 2025-08-23 DIAGNOSIS — G47.33 OBSTRUCTIVE SLEEP APNEA (ADULT) (PEDIATRIC): ICD-10-CM

## 2025-08-23 DIAGNOSIS — E11.9 TYPE 2 DIABETES MELLITUS W/OUT COMPLICATIONS: ICD-10-CM

## 2025-08-23 PROCEDURE — 99396 PREV VISIT EST AGE 40-64: CPT

## 2025-08-23 PROCEDURE — 99213 OFFICE O/P EST LOW 20 MIN: CPT | Mod: 25

## 2025-08-23 PROCEDURE — 93000 ELECTROCARDIOGRAM COMPLETE: CPT

## 2025-09-04 ENCOUNTER — APPOINTMENT (OUTPATIENT)
Dept: UROLOGY | Facility: CLINIC | Age: 60
End: 2025-09-04
Payer: COMMERCIAL

## 2025-09-04 PROCEDURE — 99214 OFFICE O/P EST MOD 30 MIN: CPT | Mod: 95

## 2025-09-04 PROCEDURE — G2211 COMPLEX E/M VISIT ADD ON: CPT | Mod: NC,95

## 2025-09-10 ENCOUNTER — TRANSCRIPTION ENCOUNTER (OUTPATIENT)
Age: 60
End: 2025-09-10